# Patient Record
Sex: FEMALE
[De-identification: names, ages, dates, MRNs, and addresses within clinical notes are randomized per-mention and may not be internally consistent; named-entity substitution may affect disease eponyms.]

---

## 2018-12-03 ENCOUNTER — HOSPITAL ENCOUNTER (OUTPATIENT)
Dept: HOSPITAL 88 - MAMMO | Age: 48
End: 2018-12-03
Attending: INTERNAL MEDICINE
Payer: COMMERCIAL

## 2018-12-03 DIAGNOSIS — Z12.31: Primary | ICD-10-CM

## 2018-12-03 PROCEDURE — 77067 SCR MAMMO BI INCL CAD: CPT

## 2018-12-11 NOTE — DIAGNOSTIC IMAGING REPORT
#[id number] - [id number]

#BILATERAL DIGITAL SCREENING MAMMOGRAM WITH CAD: 12/3/2018

CLINICAL: Routine screening.  



Comparison is made to exam dated:  9/19/2017 mammogram - [facility].  

Current study contains 4 films.  

The tissue of both breasts is extremely dense, which lowers the sensitivity of mammography.  

Current study was also evaluated with a Computer Aided Detection (CAD) system.  

There are benign calcifications in both breasts.  

No significant masses, calcifications, or other findings are seen in either breast.  

There has been no significant interval change.



IMPRESSION: BENIGN

There is no mammographic evidence of malignancy.  A 1 year screening mammogram is recommended. 

The patient will be notified by letter of the results.  





Karan barba/wan:12/10/2018 15:23:13  



Imaging Technologist: Quyen HUDDLESTON)([place]), [facility]

letter sent: Compared to Prior B9  

Mammogram BI-RADS: 2 Benign

## 2019-02-25 ENCOUNTER — HOSPITAL ENCOUNTER (OUTPATIENT)
Dept: HOSPITAL 88 - US | Age: 49
End: 2019-02-25
Attending: INTERNAL MEDICINE
Payer: COMMERCIAL

## 2019-02-25 DIAGNOSIS — D17.1: ICD-10-CM

## 2019-02-25 DIAGNOSIS — M16.11: Primary | ICD-10-CM

## 2019-02-25 NOTE — DIAGNOSTIC IMAGING REPORT
Exam:  Right hip 2 views



History:  Pain



Comparison: None.



Findings: No fracture or malalignment. Joint spaces preserved. Calcific

tendinopathy at the gluteal insertion on the greater trochanter.



Impression:



No acute osseous abnormality



Calcific tendinopathy at the gluteal insertion on the greater trochanter.



Signed by: Dr. Andrew Palisch, M.D. on 2/25/2019 3:45 PM

## 2019-03-04 ENCOUNTER — HOSPITAL ENCOUNTER (OUTPATIENT)
Dept: HOSPITAL 88 - US | Age: 49
End: 2019-03-04
Attending: INTERNAL MEDICINE
Payer: COMMERCIAL

## 2019-03-04 DIAGNOSIS — M16.11: ICD-10-CM

## 2019-03-04 DIAGNOSIS — I20.9: ICD-10-CM

## 2019-03-04 DIAGNOSIS — D17.1: Primary | ICD-10-CM

## 2019-03-04 PROCEDURE — 76705 ECHO EXAM OF ABDOMEN: CPT

## 2019-03-04 NOTE — DIAGNOSTIC IMAGING REPORT
Examination: Limited abdominal ultrasound.



Clinical indication: Suspected superficial lipomatous lesion.



Technique: Transverse and longitudinal sonographic images of the left lateral

abdominal wall were obtained along with comparison images of the right lateral

abdominal wall. Supplemental color Doppler analysis was performed.



Findings:



Sonographic evaluation of the area of interest shows no discrete soft tissue

lesion or fluid collection. Color Doppler analysis shows no hypervascularity of

the area of interest.



Impression:



No discrete mass lesion or fluid collection in the area of interest. If there

is strong clinical concern, MRI with and without contrast may be of benefit for

evaluation for soft tissue mass.



Signed by: Dr. Teto Dougherty M.D. on 3/4/2019 2:01 PM

## 2021-04-26 ENCOUNTER — HOSPITAL ENCOUNTER (OUTPATIENT)
Dept: HOSPITAL 88 - MAMMO | Age: 51
End: 2021-04-26
Attending: INTERNAL MEDICINE
Payer: COMMERCIAL

## 2021-04-26 DIAGNOSIS — Z12.31: Primary | ICD-10-CM

## 2021-04-26 DIAGNOSIS — M85.80: ICD-10-CM

## 2021-04-26 PROCEDURE — 77080 DXA BONE DENSITY AXIAL: CPT

## 2021-04-26 PROCEDURE — 77067 SCR MAMMO BI INCL CAD: CPT

## 2022-05-20 ENCOUNTER — HOSPITAL ENCOUNTER (OUTPATIENT)
Dept: HOSPITAL 88 - MRI | Age: 52
End: 2022-05-20
Attending: INTERNAL MEDICINE
Payer: COMMERCIAL

## 2022-05-20 DIAGNOSIS — M25.312: ICD-10-CM

## 2022-05-20 DIAGNOSIS — Z12.31: Primary | ICD-10-CM

## 2022-05-20 PROCEDURE — 77067 SCR MAMMO BI INCL CAD: CPT
